# Patient Record
Sex: FEMALE | Race: WHITE | NOT HISPANIC OR LATINO | Employment: UNEMPLOYED | URBAN - METROPOLITAN AREA
[De-identification: names, ages, dates, MRNs, and addresses within clinical notes are randomized per-mention and may not be internally consistent; named-entity substitution may affect disease eponyms.]

---
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
## 2023-09-13 ENCOUNTER — HOSPITAL ENCOUNTER (EMERGENCY)
Facility: HOSPITAL | Age: 18
Discharge: HOME | End: 2023-09-13
Attending: STUDENT IN AN ORGANIZED HEALTH CARE EDUCATION/TRAINING PROGRAM
Payer: MEDICAID

## 2023-09-13 VITALS
BODY MASS INDEX: 30.79 KG/M2 | HEART RATE: 67 BPM | HEIGHT: 62 IN | RESPIRATION RATE: 13 BRPM | WEIGHT: 167.31 LBS | SYSTOLIC BLOOD PRESSURE: 106 MMHG | OXYGEN SATURATION: 98 % | TEMPERATURE: 98 F | DIASTOLIC BLOOD PRESSURE: 67 MMHG

## 2023-09-13 DIAGNOSIS — R10.9 ABDOMINAL PAIN, UNSPECIFIED ABDOMINAL LOCATION: Primary | ICD-10-CM

## 2023-09-13 LAB
ALBUMIN SERPL-MCNC: 4.9 G/DL (ref 3.5–5)
ALBUMIN/GLOB SERPL: 1.3 RATIO (ref 1.1–2)
ALP SERPL-CCNC: 87 UNIT/L (ref 40–150)
ALT SERPL-CCNC: 15 UNIT/L (ref 0–55)
APPEARANCE UR: CLEAR
AST SERPL-CCNC: 17 UNIT/L (ref 5–34)
B-HCG UR QL: NEGATIVE
BACTERIA #/AREA URNS AUTO: ABNORMAL /HPF
BASOPHILS # BLD AUTO: 0.07 X10(3)/MCL
BASOPHILS NFR BLD AUTO: 0.7 %
BILIRUB SERPL-MCNC: 0.7 MG/DL
BILIRUB UR QL STRIP.AUTO: NEGATIVE
BUN SERPL-MCNC: 10.2 MG/DL (ref 7–18.7)
CALCIUM SERPL-MCNC: 10.8 MG/DL (ref 8.4–10.2)
CHLORIDE SERPL-SCNC: 104 MMOL/L (ref 98–107)
CO2 SERPL-SCNC: 27 MMOL/L (ref 22–29)
COLOR UR: YELLOW
CREAT SERPL-MCNC: 0.93 MG/DL (ref 0.55–1.02)
CTP QC/QA: YES
EOSINOPHIL # BLD AUTO: 0.08 X10(3)/MCL (ref 0–0.9)
EOSINOPHIL NFR BLD AUTO: 0.9 %
ERYTHROCYTE [DISTWIDTH] IN BLOOD BY AUTOMATED COUNT: 11.1 % (ref 11.5–17)
GFR SERPLBLD CREATININE-BSD FMLA CKD-EPI: >60 MLS/MIN/1.73/M2
GLOBULIN SER-MCNC: 3.7 GM/DL (ref 2.4–3.5)
GLUCOSE SERPL-MCNC: 87 MG/DL (ref 74–100)
GLUCOSE UR QL STRIP.AUTO: NORMAL
HCT VFR BLD AUTO: 43.8 % (ref 37–47)
HGB BLD-MCNC: 14.8 G/DL (ref 12–16)
HYALINE CASTS #/AREA URNS LPF: ABNORMAL /LPF
IMM GRANULOCYTES # BLD AUTO: 0.02 X10(3)/MCL (ref 0–0.04)
IMM GRANULOCYTES NFR BLD AUTO: 0.2 %
KETONES UR QL STRIP.AUTO: NEGATIVE
LEUKOCYTE ESTERASE UR QL STRIP.AUTO: NEGATIVE
LIPASE SERPL-CCNC: 17 U/L
LYMPHOCYTES # BLD AUTO: 3.04 X10(3)/MCL (ref 0.6–4.6)
LYMPHOCYTES NFR BLD AUTO: 32.5 %
MAGNESIUM SERPL-MCNC: 2.1 MG/DL (ref 1.6–2.6)
MCH RBC QN AUTO: 30 PG (ref 27–31)
MCHC RBC AUTO-ENTMCNC: 33.8 G/DL (ref 33–36)
MCV RBC AUTO: 88.8 FL (ref 80–94)
MONOCYTES # BLD AUTO: 0.66 X10(3)/MCL (ref 0.1–1.3)
MONOCYTES NFR BLD AUTO: 7.1 %
MUCOUS THREADS URNS QL MICRO: ABNORMAL /LPF
NEUTROPHILS # BLD AUTO: 5.47 X10(3)/MCL (ref 2.1–9.2)
NEUTROPHILS NFR BLD AUTO: 58.6 %
NITRITE UR QL STRIP.AUTO: NEGATIVE
NRBC BLD AUTO-RTO: 0 %
PH UR STRIP.AUTO: 7 [PH]
PLATELET # BLD AUTO: 326 X10(3)/MCL (ref 130–400)
PMV BLD AUTO: 10.9 FL (ref 7.4–10.4)
POTASSIUM SERPL-SCNC: 3.4 MMOL/L (ref 3.5–5.1)
PROT SERPL-MCNC: 8.6 GM/DL (ref 6.4–8.3)
PROT UR QL STRIP.AUTO: ABNORMAL
RBC # BLD AUTO: 4.93 X10(6)/MCL (ref 4.2–5.4)
RBC #/AREA URNS AUTO: ABNORMAL /HPF
RBC UR QL AUTO: NEGATIVE
SODIUM SERPL-SCNC: 142 MMOL/L (ref 136–145)
SP GR UR STRIP.AUTO: 1.02 (ref 1–1.03)
SQUAMOUS #/AREA URNS LPF: ABNORMAL /HPF
UROBILINOGEN UR STRIP-ACNC: NORMAL
WBC # SPEC AUTO: 9.34 X10(3)/MCL (ref 4.5–11.5)
WBC #/AREA URNS AUTO: ABNORMAL /HPF

## 2023-09-13 PROCEDURE — 96375 TX/PRO/DX INJ NEW DRUG ADDON: CPT

## 2023-09-13 PROCEDURE — 25000003 PHARM REV CODE 250: Performed by: STUDENT IN AN ORGANIZED HEALTH CARE EDUCATION/TRAINING PROGRAM

## 2023-09-13 PROCEDURE — 83735 ASSAY OF MAGNESIUM: CPT | Performed by: STUDENT IN AN ORGANIZED HEALTH CARE EDUCATION/TRAINING PROGRAM

## 2023-09-13 PROCEDURE — 99285 EMERGENCY DEPT VISIT HI MDM: CPT | Mod: 25

## 2023-09-13 PROCEDURE — 25500020 PHARM REV CODE 255

## 2023-09-13 PROCEDURE — 96361 HYDRATE IV INFUSION ADD-ON: CPT

## 2023-09-13 PROCEDURE — 81025 URINE PREGNANCY TEST: CPT | Performed by: STUDENT IN AN ORGANIZED HEALTH CARE EDUCATION/TRAINING PROGRAM

## 2023-09-13 PROCEDURE — 63600175 PHARM REV CODE 636 W HCPCS: Performed by: STUDENT IN AN ORGANIZED HEALTH CARE EDUCATION/TRAINING PROGRAM

## 2023-09-13 PROCEDURE — 81001 URINALYSIS AUTO W/SCOPE: CPT | Performed by: STUDENT IN AN ORGANIZED HEALTH CARE EDUCATION/TRAINING PROGRAM

## 2023-09-13 PROCEDURE — 83690 ASSAY OF LIPASE: CPT | Performed by: STUDENT IN AN ORGANIZED HEALTH CARE EDUCATION/TRAINING PROGRAM

## 2023-09-13 PROCEDURE — 80053 COMPREHEN METABOLIC PANEL: CPT | Performed by: STUDENT IN AN ORGANIZED HEALTH CARE EDUCATION/TRAINING PROGRAM

## 2023-09-13 PROCEDURE — 96374 THER/PROPH/DIAG INJ IV PUSH: CPT | Mod: 59

## 2023-09-13 PROCEDURE — 85025 COMPLETE CBC W/AUTO DIFF WBC: CPT | Performed by: STUDENT IN AN ORGANIZED HEALTH CARE EDUCATION/TRAINING PROGRAM

## 2023-09-13 RX ORDER — ONDANSETRON 4 MG/1
4 TABLET, ORALLY DISINTEGRATING ORAL EVERY 12 HOURS PRN
Qty: 20 TABLET | Refills: 0 | Status: SHIPPED | OUTPATIENT
Start: 2023-09-13

## 2023-09-13 RX ORDER — ONDANSETRON 2 MG/ML
4 INJECTION INTRAMUSCULAR; INTRAVENOUS
Status: COMPLETED | OUTPATIENT
Start: 2023-09-13 | End: 2023-09-13

## 2023-09-13 RX ORDER — KETOROLAC TROMETHAMINE 30 MG/ML
15 INJECTION, SOLUTION INTRAMUSCULAR; INTRAVENOUS
Status: COMPLETED | OUTPATIENT
Start: 2023-09-13 | End: 2023-09-13

## 2023-09-13 RX ORDER — DICYCLOMINE HYDROCHLORIDE 20 MG/1
20 TABLET ORAL 3 TIMES DAILY PRN
Qty: 20 TABLET | Refills: 0 | Status: SHIPPED | OUTPATIENT
Start: 2023-09-13

## 2023-09-13 RX ORDER — MORPHINE SULFATE 2 MG/ML
4 INJECTION, SOLUTION INTRAMUSCULAR; INTRAVENOUS
Status: DISCONTINUED | OUTPATIENT
Start: 2023-09-13 | End: 2023-09-14 | Stop reason: HOSPADM

## 2023-09-13 RX ADMIN — SODIUM CHLORIDE 1000 ML: 9 INJECTION, SOLUTION INTRAVENOUS at 09:09

## 2023-09-13 RX ADMIN — IOHEXOL 100 ML: 350 INJECTION, SOLUTION INTRAVENOUS at 09:09

## 2023-09-13 RX ADMIN — ONDANSETRON 4 MG: 2 INJECTION INTRAMUSCULAR; INTRAVENOUS at 09:09

## 2023-09-13 RX ADMIN — KETOROLAC TROMETHAMINE 15 MG: 30 INJECTION, SOLUTION INTRAMUSCULAR; INTRAVENOUS at 09:09

## 2023-09-13 NOTE — Clinical Note
"Rosalba Salmon (Kaitlyn)evelio was seen and treated in our emergency department on 9/13/2023.  She may return to work on 09/15/2023.       If you have any questions or concerns, please don't hesitate to call.      Rajesh GARCIA    "

## 2023-09-14 NOTE — DISCHARGE INSTRUCTIONS
A referral has been placed, they should be calling you soon to set up a GI appointment.  Take your prescriptions as prescribed, return to the ER with any new or worsening symptoms.

## 2023-09-14 NOTE — ED PROVIDER NOTES
Encounter Date: 9/13/2023       History     Chief Complaint   Patient presents with    Abdominal Pain     RLQ pain x3 days     Patient presents to the emergency department complaining of right lower quadrant abdominal pain.  She states the pain started 3 days ago, initially it was a vague pain right upper quadrant but as the days have gone on it was now more localized in the right lower quadrant.  She also had no appetite and when she tries to eat she would vomit since she is been having diarrhea as well.  She states the pain hurts worse when she moves and the bumps in the car on the way here made the pain worse.  No fevers.  Has a previous history of a cholecystectomy.    The history is provided by the patient.     Review of patient's allergies indicates:   Allergen Reactions    Amoxicillin-pot clavulanate     Cinnamon analogues      Past Medical History:   Diagnosis Date    ADHD     Anxiety     Arrhythmia     Asthma     GERD (gastroesophageal reflux disease)     Mycoplasma pneumonia     Vesicoureteral reflux      Past Surgical History:   Procedure Laterality Date    CHOLECYSTECTOMY       Family History   Problem Relation Age of Onset    Gallbladder disease Mother         removed    Hypertension Maternal Grandmother     Atrial fibrillation Maternal Grandfather     Arrhythmia Maternal Grandfather     Irregular heart beat Maternal Grandfather     Hypertension Maternal Grandfather     Anemia Neg Hx     Cardiomyopathy Neg Hx     Childhood respiratory disease Neg Hx     Clotting disorder Neg Hx     Congenital heart disease Neg Hx     Early death Neg Hx     Heart attacks under age 50 Neg Hx      Social History     Tobacco Use    Smoking status: Passive Smoke Exposure - Never Smoker     Review of Systems   Constitutional:  Negative for chills and fever.   HENT:  Negative for congestion and sore throat.    Respiratory:  Negative for cough and shortness of breath.    Cardiovascular:  Negative for chest pain and  palpitations.   Gastrointestinal:  Positive for abdominal pain, diarrhea, nausea and vomiting.   Genitourinary:  Negative for dysuria and hematuria.   Musculoskeletal:  Negative for arthralgias and myalgias.   Neurological:  Negative for dizziness and weakness.       Physical Exam     Initial Vitals [09/13/23 2040]   BP Pulse Resp Temp SpO2   (!) 147/105 99 20 98.3 °F (36.8 °C) 99 %      MAP       --         Physical Exam    Nursing note and vitals reviewed.  Constitutional: She appears well-developed and well-nourished.   HENT:   Head: Normocephalic and atraumatic.   Eyes: EOM are normal. Pupils are equal, round, and reactive to light.   Neck: Neck supple.   Normal range of motion.  Cardiovascular:  Normal rate and regular rhythm.           Pulmonary/Chest: Breath sounds normal. No respiratory distress.   Abdominal: Abdomen is soft. There is abdominal tenderness (right lower quadrant). There is guarding.   Musculoskeletal:         General: No edema. Normal range of motion.      Cervical back: Normal range of motion and neck supple.     Neurological: She is alert and oriented to person, place, and time.   Skin: Skin is warm and dry.         ED Course   Procedures  Labs Reviewed   COMPREHENSIVE METABOLIC PANEL - Abnormal; Notable for the following components:       Result Value    Potassium Level 3.4 (*)     Calcium Level Total 10.8 (*)     Protein Total 8.6 (*)     Globulin 3.7 (*)     All other components within normal limits   URINALYSIS, REFLEX TO URINE CULTURE - Abnormal; Notable for the following components:    Protein, UA Trace (*)     Bacteria, UA Trace (*)     Squamous Epithelial Cells, UA Few (*)     Mucous, UA Moderate (*)     All other components within normal limits   CBC WITH DIFFERENTIAL - Abnormal; Notable for the following components:    RDW 11.1 (*)     MPV 10.9 (*)     All other components within normal limits   LIPASE - Normal   MAGNESIUM - Normal   CBC W/ AUTO DIFFERENTIAL    Narrative:     The  following orders were created for panel order CBC auto differential.  Procedure                               Abnormality         Status                     ---------                               -----------         ------                     CBC with Differential[[id number]]       Abnormal            Final result                 Please view results for these tests on the individual orders.   EXTRA TUBES    Narrative:     The following orders were created for panel order EXTRA TUBES.  Procedure                               Abnormality         Status                     ---------                               -----------         ------                     Light Blue Top Hold[[id number]]                             In process                 Red Top Hold[[id number]]                                    In process                 Gold Top Hold[[id number]]                                   In process                 Pink Top Hold[[id number]]                                   In process                   Please view results for these tests on the individual orders.   LIGHT BLUE TOP HOLD   RED TOP HOLD   GOLD TOP HOLD   PINK TOP HOLD   POCT URINE PREGNANCY          Imaging Results              CT Abdomen Pelvis With Contrast (Final result)  Result time 09/14/23 09:18:10      Final result by Reid Sharp MD (09/14/23 09:18:10)                   Impression:      No acute abnormalities are seen      Electronically signed by: Reid Sharp MD  Date:    09/14/2023  Time:    09:18               Narrative:    EXAMINATION:  CT ABDOMEN PELVIS WITH CONTRAST    CLINICAL HISTORY:  RLQ abdominal pain (Age >= 14y);    TECHNIQUE:  Low dose axial images, sagittal and coronal reformations were obtained from the lung bases to the pubic symphysis following the IV administration of 100 mL of Omnipaque 350 no oral contrast was given.    Automatic exposure control (AEC) was utilized for dose reduction.    Dose: 618  mGycm    COMPARISON:  None.    FINDINGS:  Lung bases are clear.  Liver appears normal.  Spleen appears normal.  Pancreas appears normal.  Patient has had a previous cholecystectomy.  The adrenals are not enlarged.  Kidneys appear normal.  Aorta shows no evidence of an aneurysm.  The appendix appears normal.  Uterus appears normal.                        Preliminary result by Reid Sharp MD (09/13/23 22:09:01)                   Narrative:    START OF REPORT:  Technique: CT of the abdomen and pelvis was performed with axial images as well as sagittal and coronal reconstruction images with intravenous contrast.    Comparison: None available.    Clinical History: RLQ abdominal pain.    Dosage Information: Automated Exposure Control was utilized.    Findings:  Lines and Tubes: None.  Thorax:  Lungs: The visualized lung bases appear unremarkable.  Pleura: No pleural effusion is seen.  Heart: The heart size is within normal limits.  Abdomen:  Abdominal Wall: No abdominal wall pathology is seen.  Liver: The liver appears unremarkable.  Biliary System: No intrahepatic or extrahepatic biliary duct dilatation is seen.  Gallbladder: Surgical clips are seen in the gallbladder fossa consistent with prior cholecystectomy correlate with surgical history and visual inspection.  Pancreas: The pancreas appears unremarkable.  Spleen: The spleen appears unremarkable.  Adrenals: The adrenal glands appear unremarkable.  Kidneys: The kidneys appear unremarkable with no stones cysts masses or hydronephrosis.  Aorta: The abdominal aorta appears unremarkable.  IVC: Unremarkable.  Bowel:  Esophagus: The visualized esophagus appears unremarkable.  Stomach: The stomach appears unremarkable.  Duodenum: Unremarkable appearing duodenum.  Small Bowel: The small bowel appears unremarkable.  Colon: Nondistended.  Appendix: The appendix appears unremarkable and is seen on series 2, images 91-96.  Peritoneum: No intraperitoneal free air or ascites  is seen.    Pelvis:  Bladder: The bladder appears unremarkable.  Female:  Uterus: The uterus appears unremarkable.  Ovaries: The ovaries appear unremarkable.    Bony structures:  Dorsal Spine: The visualized dorsal spine appears unremarkable.  Bony Pelvis: The visualized bony structures of the pelvis appear unremarkable.      Impression:  1. No acute intraabdominal or pelvic pathology is identified. Details and other findings as discussed above.                                         Medications   sodium chloride 0.9% bolus 1,000 mL 1,000 mL (0 mLs Intravenous Stopped 9/13/23 2206)   ondansetron injection 4 mg (4 mg Intravenous Given 9/13/23 2100)   ketorolac injection 15 mg (15 mg Intravenous Given 9/13/23 2108)   iohexoL (OMNIPAQUE 350) 350 mg iodine/mL injection (100 mLs Intravenous Given 9/13/23 2115)     Medical Decision Making  Vital signs are stable.  She was given IV fluids IV pain meds and IV antiemetics.  CBC, CMP, urine were unremarkable, lipase is also within normal limits.  CT of the abdomen and pelvis shows no acute process.  On re-evaluation, patient was now stating that she is had pain like this since she was 12 years old, seen multiple physicians and besides having her gallbladder out nothing has ever been found wrong.  Given her reassuring workup today, we will discharge with a prescription for Bentyl and referred to GI, returnprecautions were given.    Amount and/or Complexity of Data Reviewed  Labs: ordered. Decision-making details documented in ED Course.  Radiology: ordered. Decision-making details documented in ED Course.    Risk  Prescription drug management.                               Clinical Impression:   Final diagnoses:  [R10.9] Abdominal pain, unspecified abdominal location (Primary)        ED Disposition Condition    Discharge Stable          ED Prescriptions       Medication Sig Dispense Start Date End Date Auth. Provider    dicyclomine (BENTYL) 20 mg tablet Take 1 tablet (20 mg  total) by mouth 3 (three) times daily as needed (pain). 20 tablet 9/13/2023 -- Fly Butler MD    ondansetron (ZOFRAN-ODT) 4 MG TbDL Take 1 tablet (4 mg total) by mouth every 12 (twelve) hours as needed (Nausea). 20 tablet 9/13/2023 -- Fly Butler MD          Follow-up Information       Follow up With Specialties Details Why Contact Info    [facility] Emergency Dept Emergency Medicine Go to  As needed [address] [facility] [address] [phone]             Fly Butler MD  09/16/23 1955

## 2023-10-12 ENCOUNTER — HOSPITAL ENCOUNTER (EMERGENCY)
Facility: HOSPITAL | Age: 18
Discharge: HOME | End: 2023-10-12
Attending: EMERGENCY MEDICINE
Payer: MEDICAID

## 2023-10-12 VITALS
TEMPERATURE: 98 F | OXYGEN SATURATION: 100 % | BODY MASS INDEX: 30.83 KG/M2 | SYSTOLIC BLOOD PRESSURE: 132 MMHG | HEART RATE: 77 BPM | RESPIRATION RATE: 18 BRPM | DIASTOLIC BLOOD PRESSURE: 81 MMHG | WEIGHT: 167.56 LBS | HEIGHT: 62 IN

## 2023-10-12 DIAGNOSIS — L70.9 ACNE, UNSPECIFIED ACNE TYPE: Primary | ICD-10-CM

## 2023-10-12 LAB
B-HCG UR QL: NEGATIVE
CTP QC/QA: YES

## 2023-10-12 PROCEDURE — 99283 EMERGENCY DEPT VISIT LOW MDM: CPT

## 2023-10-12 PROCEDURE — 81025 URINE PREGNANCY TEST: CPT | Performed by: PHYSICIAN ASSISTANT

## 2023-10-12 RX ORDER — DOXYCYCLINE 100 MG/1
100 CAPSULE ORAL 2 TIMES DAILY
Qty: 28 CAPSULE | Refills: 0 | Status: SHIPPED | OUTPATIENT
Start: 2023-10-12 | End: 2023-10-26

## 2023-10-12 NOTE — DISCHARGE INSTRUCTIONS
Report to local health unit for full STD testing.  Take medication as prescribed.  Follow up with the primary care provider within 2-3 days.

## 2023-10-12 NOTE — ED PROVIDER NOTES
Encounter Date: 10/12/2023       History     Chief Complaint   Patient presents with    Wound Check     C/o acne to face, states has purulent drainage . Also has come in contact with herpes breakout. Would like to be tested. C/o sore throat also    Exposure to STD    Sore Throat     18-year-old female presents to the emergency department with complaints of pustular acne to face, dry/ scratchy throat and STD exposure.  Patient states that her boyfriend revealed that he had HSV 2 and she states that they had intercourse with an active outbreak without her being aware of the time.  Patient denies symptoms, lesions or pain however is requesting or herpes test.  Patient denies dysuria, hematuria, abdominal pain, nausea, vomiting, vaginal lesions.  She states she is being currently treated for bacterial vaginosis after being tested at an urgent care.    The history is provided by the patient. No  was used.     Review of patient's allergies indicates:   Allergen Reactions    Amoxicillin-pot clavulanate     Cinnamon analogues      Past Medical History:   Diagnosis Date    ADHD     Anxiety     Arrhythmia     Asthma     GERD (gastroesophageal reflux disease)     Mycoplasma pneumonia     Vesicoureteral reflux      Past Surgical History:   Procedure Laterality Date    CHOLECYSTECTOMY       Family History   Problem Relation Age of Onset    Gallbladder disease Mother         removed    Hypertension Maternal Grandmother     Atrial fibrillation Maternal Grandfather     Arrhythmia Maternal Grandfather     Irregular heart beat Maternal Grandfather     Hypertension Maternal Grandfather     Anemia Neg Hx     Cardiomyopathy Neg Hx     Childhood respiratory disease Neg Hx     Clotting disorder Neg Hx     Congenital heart disease Neg Hx     Early death Neg Hx     Heart attacks under age 50 Neg Hx      Social History     Tobacco Use    Smoking status: Passive Smoke Exposure - Never Smoker   Substance Use Topics     Alcohol use: Never    Drug use: Never     Review of Systems   Constitutional:  Negative for chills and fever.   Eyes: Negative.    Respiratory:  Negative for cough, chest tightness and shortness of breath.    Cardiovascular:  Negative for chest pain and palpitations.   Gastrointestinal:  Negative for abdominal pain, diarrhea, nausea and vomiting.   Genitourinary:  Negative for decreased urine volume, dysuria, flank pain, genital sores, hematuria, pelvic pain, vaginal bleeding, vaginal discharge and vaginal pain.   Musculoskeletal:  Negative for back pain and myalgias.   Skin:  Negative for rash and wound.        Painful acne on face   Neurological:  Negative for dizziness, weakness and numbness.       Physical Exam     Initial Vitals [10/12/23 0936]   BP Pulse Resp Temp SpO2   134/89 79 20 97.5 °F (36.4 °C) 98 %      MAP       --         Physical Exam    Nursing note and vitals reviewed.  Constitutional: She appears well-developed and well-nourished.   HENT:   Nose: Nose normal.   Eyes: Conjunctivae are normal.   Neck: Neck supple.   Normal range of motion.  Cardiovascular:  Normal rate, regular rhythm, normal heart sounds and intact distal pulses.           Pulmonary/Chest: Breath sounds normal.   Abdominal: Abdomen is soft. Bowel sounds are normal. There is no abdominal tenderness. There is no rebound and no guarding.   Genitourinary:    Genitourinary Comments: Patient declined pelvic exam     Musculoskeletal:         General: Normal range of motion.      Cervical back: Normal range of motion and neck supple.     Neurological: She is alert.   Skin: Skin is warm. Capillary refill takes less than 2 seconds.   Pustular acne to face         ED Course   Procedures  Labs Reviewed   POCT URINE PREGNANCY          Imaging Results    None          Medications - No data to display  Medical Decision Making  18-year-old female presents to the emergency department with complaints of pustular acne to face, dry/ scratchy throat  and STD exposure.  Patient states that her boyfriend revealed that he had HSV 2 and she states that they had intercourse with an active outbreak without her being aware of the time.  Patient denies symptoms, lesions or pain however is requesting or herpes test.  Patient denies dysuria, hematuria, abdominal pain, nausea, vomiting, vaginal lesions.  She states she is being currently treated for bacterial vaginosis after being tested at an urgent care.    DDx:  STD exposure, pustular acne, allergic reaction    I prescribed doxycycline for facial acne and advised patient to follow up with a dermatologist for further evaluation and treatment.  I also advised the patient to report to [facility] for full STD testing and discussed with her that the emergency department does not do screening STD testing.  Patient states she will report to local [facility] unit at the time of discharge.    The patient is resting comfortably and in no acute distress.  Gave strict ED precautions, discussed specific conditions for return to the emergency department and importance of follow up with her primary care provider and dermatologist.  Patient voices understanding and agrees to the plan discussed. All patients' questions have been answered at this time.   She has remained hemodynamically stable throughout entire stay in ED and is stable for discharge home.      Amount and/or Complexity of Data Reviewed  Labs: ordered.    Risk  Prescription drug management.                               Clinical Impression:   Final diagnoses:  [L70.9] Acne, unspecified acne type (Primary)        ED Disposition Condition    Discharge Stable          ED Prescriptions       Medication Sig Dispense Start Date End Date Auth. Provider    doxycycline (VIBRAMYCIN) 100 MG Cap Take 1 capsule (100 mg total) by mouth 2 (two) times daily. for 14 days 28 capsule 10/12/2023 10/26/2023 Aretha Dueñas PA          Follow-up Information       Follow up With Specialties  Details Why Contact Info    [facility] - Emergency Dept Emergency Medicine  As needed, If symptoms worsen [address] [facility] [address] [phone]             Aretha Dueñas PA  10/12/23 4472

## 2024-05-13 ENCOUNTER — HOSPITAL ENCOUNTER (EMERGENCY)
Facility: HOSPITAL | Age: 19
Discharge: HOME | End: 2024-05-13
Attending: FAMILY MEDICINE
Payer: MEDICAID

## 2024-05-13 VITALS
DIASTOLIC BLOOD PRESSURE: 49 MMHG | TEMPERATURE: 98 F | HEART RATE: 69 BPM | BODY MASS INDEX: 34.08 KG/M2 | SYSTOLIC BLOOD PRESSURE: 94 MMHG | RESPIRATION RATE: 18 BRPM | HEIGHT: 62 IN | WEIGHT: 185.19 LBS | OXYGEN SATURATION: 97 %

## 2024-05-13 DIAGNOSIS — G43.909 MIGRAINE WITHOUT STATUS MIGRAINOSUS, NOT INTRACTABLE, UNSPECIFIED MIGRAINE TYPE: Primary | ICD-10-CM

## 2024-05-13 DIAGNOSIS — N92.6 ABNORMAL MENSTRUAL CYCLE: ICD-10-CM

## 2024-05-13 LAB
ALBUMIN SERPL-MCNC: 4.5 G/DL (ref 3.5–5)
ALBUMIN/GLOB SERPL: 1.2 RATIO (ref 1.1–2)
ALP SERPL-CCNC: 92 UNIT/L (ref 40–150)
ALT SERPL-CCNC: 19 UNIT/L (ref 0–55)
AST SERPL-CCNC: 16 UNIT/L (ref 5–34)
B-HCG UR QL: NEGATIVE
BASOPHILS # BLD AUTO: 0.06 X10(3)/MCL
BASOPHILS NFR BLD AUTO: 0.4 %
BILIRUB SERPL-MCNC: 0.7 MG/DL
BUN SERPL-MCNC: 8.6 MG/DL (ref 7–18.7)
CALCIUM SERPL-MCNC: 10.3 MG/DL (ref 8.4–10.2)
CHLORIDE SERPL-SCNC: 105 MMOL/L (ref 98–107)
CO2 SERPL-SCNC: 21 MMOL/L (ref 22–29)
CREAT SERPL-MCNC: 0.8 MG/DL (ref 0.55–1.02)
CTP QC/QA: YES
EOSINOPHIL # BLD AUTO: 0.02 X10(3)/MCL (ref 0–0.9)
EOSINOPHIL NFR BLD AUTO: 0.1 %
ERYTHROCYTE [DISTWIDTH] IN BLOOD BY AUTOMATED COUNT: 11.1 % (ref 11.5–17)
FLUAV AG UPPER RESP QL IA.RAPID: NOT DETECTED
FLUBV AG UPPER RESP QL IA.RAPID: NOT DETECTED
GFR SERPLBLD CREATININE-BSD FMLA CKD-EPI: >60 ML/MIN/1.73/M2
GLOBULIN SER-MCNC: 3.8 GM/DL (ref 2.4–3.5)
GLUCOSE SERPL-MCNC: 90 MG/DL (ref 74–100)
HCT VFR BLD AUTO: 43.9 % (ref 37–47)
HGB BLD-MCNC: 15.4 G/DL (ref 12–16)
HOLD SPECIMEN: NORMAL
IMM GRANULOCYTES # BLD AUTO: 0.05 X10(3)/MCL (ref 0–0.04)
IMM GRANULOCYTES NFR BLD AUTO: 0.4 %
LIPASE SERPL-CCNC: 18 U/L
LYMPHOCYTES # BLD AUTO: 1.93 X10(3)/MCL (ref 0.6–4.6)
LYMPHOCYTES NFR BLD AUTO: 14.1 %
MCH RBC QN AUTO: 30.1 PG (ref 27–31)
MCHC RBC AUTO-ENTMCNC: 35.1 G/DL (ref 33–36)
MCV RBC AUTO: 85.7 FL (ref 80–94)
MONOCYTES # BLD AUTO: 0.51 X10(3)/MCL (ref 0.1–1.3)
MONOCYTES NFR BLD AUTO: 3.7 %
NEUTROPHILS # BLD AUTO: 11.1 X10(3)/MCL (ref 2.1–9.2)
NEUTROPHILS NFR BLD AUTO: 81.3 %
NRBC BLD AUTO-RTO: 0 %
PLATELET # BLD AUTO: 335 X10(3)/MCL (ref 130–400)
PMV BLD AUTO: 10.8 FL (ref 7.4–10.4)
POTASSIUM SERPL-SCNC: 3.7 MMOL/L (ref 3.5–5.1)
PROT SERPL-MCNC: 8.3 GM/DL (ref 6.4–8.3)
RBC # BLD AUTO: 5.12 X10(6)/MCL (ref 4.2–5.4)
SARS-COV-2 RNA RESP QL NAA+PROBE: NOT DETECTED
SODIUM SERPL-SCNC: 137 MMOL/L (ref 136–145)
WBC # SPEC AUTO: 13.67 X10(3)/MCL (ref 4.5–11.5)

## 2024-05-13 PROCEDURE — 83690 ASSAY OF LIPASE: CPT | Performed by: PHYSICIAN ASSISTANT

## 2024-05-13 PROCEDURE — 0240U COVID/FLU A&B PCR: CPT | Performed by: PHYSICIAN ASSISTANT

## 2024-05-13 PROCEDURE — 96361 HYDRATE IV INFUSION ADD-ON: CPT

## 2024-05-13 PROCEDURE — 99285 EMERGENCY DEPT VISIT HI MDM: CPT | Mod: 25

## 2024-05-13 PROCEDURE — 25500020 PHARM REV CODE 255

## 2024-05-13 PROCEDURE — 63600175 PHARM REV CODE 636 W HCPCS: Performed by: PHYSICIAN ASSISTANT

## 2024-05-13 PROCEDURE — 25000003 PHARM REV CODE 250: Performed by: PHYSICIAN ASSISTANT

## 2024-05-13 PROCEDURE — 80053 COMPREHEN METABOLIC PANEL: CPT | Performed by: PHYSICIAN ASSISTANT

## 2024-05-13 PROCEDURE — 96374 THER/PROPH/DIAG INJ IV PUSH: CPT | Mod: 59

## 2024-05-13 PROCEDURE — 85025 COMPLETE CBC W/AUTO DIFF WBC: CPT | Performed by: PHYSICIAN ASSISTANT

## 2024-05-13 RX ORDER — DIPHENHYDRAMINE HYDROCHLORIDE 50 MG/ML
12.5 INJECTION INTRAMUSCULAR; INTRAVENOUS
Status: DISCONTINUED | OUTPATIENT
Start: 2024-05-13 | End: 2024-05-14 | Stop reason: HOSPADM

## 2024-05-13 RX ORDER — KETOROLAC TROMETHAMINE 30 MG/ML
15 INJECTION, SOLUTION INTRAMUSCULAR; INTRAVENOUS
Status: COMPLETED | OUTPATIENT
Start: 2024-05-13 | End: 2024-05-13

## 2024-05-13 RX ORDER — KETOROLAC TROMETHAMINE 10 MG/1
10 TABLET, FILM COATED ORAL EVERY 6 HOURS
Qty: 20 TABLET | Refills: 0 | Status: SHIPPED | OUTPATIENT
Start: 2024-05-13 | End: 2024-05-18

## 2024-05-13 RX ORDER — DIPHENHYDRAMINE HCL 25 MG
25 CAPSULE ORAL EVERY 6 HOURS PRN
Qty: 20 CAPSULE | Refills: 0 | Status: SHIPPED | OUTPATIENT
Start: 2024-05-13

## 2024-05-13 RX ORDER — METOCLOPRAMIDE HYDROCHLORIDE 5 MG/ML
10 INJECTION INTRAMUSCULAR; INTRAVENOUS
Status: DISCONTINUED | OUTPATIENT
Start: 2024-05-13 | End: 2024-05-14 | Stop reason: HOSPADM

## 2024-05-13 RX ORDER — SODIUM CHLORIDE 9 MG/ML
1000 INJECTION, SOLUTION INTRAVENOUS
Status: COMPLETED | OUTPATIENT
Start: 2024-05-13 | End: 2024-05-13

## 2024-05-13 RX ORDER — METOCLOPRAMIDE 10 MG/1
10 TABLET ORAL EVERY 6 HOURS
Qty: 30 TABLET | Refills: 0 | Status: SHIPPED | OUTPATIENT
Start: 2024-05-13

## 2024-05-13 RX ADMIN — KETOROLAC TROMETHAMINE 15 MG: 30 INJECTION, SOLUTION INTRAMUSCULAR; INTRAVENOUS at 07:05

## 2024-05-13 RX ADMIN — SODIUM CHLORIDE 1000 ML: 9 INJECTION, SOLUTION INTRAVENOUS at 07:05

## 2024-05-13 RX ADMIN — IOHEXOL 100 ML: 350 INJECTION, SOLUTION INTRAVENOUS at 09:05

## 2024-05-13 NOTE — Clinical Note
"Rosalba Moise" Bret was seen and treated in our emergency department on 5/13/2024.  She may return to work on 05/15/2024.       If you have any questions or concerns, please don't hesitate to call.      Josie Edwards PA-C"

## 2024-05-13 NOTE — Clinical Note
"Rosalba Salmon (Kaitlyn)evelio was seen and treated in our emergency department on 5/13/2024.  She may return to work on 05/16/2024.       If you have any questions or concerns, please don't hesitate to call.      TOMASA Tolbert RN    "

## 2024-05-14 NOTE — ED PROVIDER NOTES
Encounter Date: 5/13/2024       History     Chief Complaint   Patient presents with    Headache     Pt. Endorses migraine headache all day. NV, bilat hand numbness and dizziness.     Rosalba Queen is a 18 y.o. female who presents to the ED with complaints of a migraine headache that started this morning. She reports nausea and vomiting as well. Reports intermittent dizziness along with bilateral hand numbness. States she took OTC medication without relief.       The history is provided by the patient. No  was used.     Review of patient's allergies indicates:   Allergen Reactions    Amoxicillin-pot clavulanate     Cinnamon analogues      Past Medical History:   Diagnosis Date    ADHD     Anxiety     Arrhythmia     Asthma     GERD (gastroesophageal reflux disease)     Mycoplasma pneumonia     Vesicoureteral reflux      Past Surgical History:   Procedure Laterality Date    CHOLECYSTECTOMY       Family History   Problem Relation Name Age of Onset    Gallbladder disease Mother          removed    Hypertension Maternal Grandmother      Atrial fibrillation Maternal Grandfather      Arrhythmia Maternal Grandfather      Irregular heart beat Maternal Grandfather      Hypertension Maternal Grandfather      Anemia Neg Hx      Cardiomyopathy Neg Hx      Childhood respiratory disease Neg Hx      Clotting disorder Neg Hx      Congenital heart disease Neg Hx      Early death Neg Hx      Heart attacks under age 50 Neg Hx       Social History     Tobacco Use    Smoking status: Passive Smoke Exposure - Never Smoker   Substance Use Topics    Alcohol use: Never    Drug use: Never     Review of Systems   Constitutional:  Negative for chills, fatigue and fever.   HENT:  Negative for congestion, ear pain, sinus pain and sore throat.    Eyes:  Negative for pain.   Respiratory:  Negative for cough, chest tightness and shortness of breath.    Cardiovascular:  Negative for chest pain.   Gastrointestinal:  Positive  for nausea and vomiting. Negative for abdominal pain, constipation and diarrhea.   Genitourinary:  Negative for dysuria.   Musculoskeletal:  Negative for back pain and joint swelling.   Skin:  Negative for color change and rash.   Neurological:  Positive for dizziness, numbness and headaches. Negative for weakness.   Psychiatric/Behavioral:  Negative for behavioral problems and confusion.        Physical Exam     Initial Vitals [05/13/24 1913]   BP Pulse Resp Temp SpO2   131/80 (!) 113 18 98.6 °F (37 °C) 98 %      MAP       --         Physical Exam    Constitutional: She appears well-developed and well-nourished.   HENT:   Head: Normocephalic and atraumatic.   Nose: Nose normal.   Eyes: EOM are normal. Pupils are equal, round, and reactive to light.   Neck: Neck supple. No thyromegaly present. No JVD present.   Normal range of motion.  Cardiovascular:  Normal rate, regular rhythm, normal heart sounds and intact distal pulses.           No murmur heard.  Pulmonary/Chest: Breath sounds normal. No respiratory distress. She has no wheezes. She has no rhonchi. She has no rales. She exhibits no tenderness.   Abdominal: Abdomen is soft. Bowel sounds are normal. She exhibits no distension. There is no abdominal tenderness. There is no rebound and no guarding.   Musculoskeletal:         General: No tenderness or edema. Normal range of motion.      Cervical back: Normal range of motion and neck supple.     Lymphadenopathy:     She has no cervical adenopathy.   Neurological: She is alert and oriented to person, place, and time.   Skin: Skin is warm and dry. Capillary refill takes less than 2 seconds.   Psychiatric: She has a normal mood and affect. Thought content normal.         ED Course   Procedures  Labs Reviewed   COMPREHENSIVE METABOLIC PANEL - Abnormal; Notable for the following components:       Result Value    CO2 21 (*)     Calcium 10.3 (*)     Globulin 3.8 (*)     All other components within normal limits   CBC WITH  DIFFERENTIAL - Abnormal; Notable for the following components:    WBC 13.67 (*)     RDW 11.1 (*)     MPV 10.8 (*)     Neut # 11.10 (*)     IG# 0.05 (*)     All other components within normal limits   LIPASE - Normal   COVID/FLU A&B PCR - Normal    Narrative:     The Xpert Xpress SARS-CoV-2/FLU/RSV plus is a rapid, multiplexed real-time PCR test intended for the simultaneous qualitative detection and differentiation of SARS-CoV-2, Influenza A, Influenza B, and respiratory syncytial virus (RSV) viral RNA in either nasopharyngeal swab or nasal swab specimens.         CBC W/ AUTO DIFFERENTIAL    Narrative:     The following orders were created for panel order CBC auto differential.  Procedure                               Abnormality         Status                     ---------                               -----------         ------                     CBC with Differential[[id number]]       Abnormal            Final result                 Please view results for these tests on the individual orders.   EXTRA TUBES    Narrative:     The following orders were created for panel order EXTRA TUBES.  Procedure                               Abnormality         Status                     ---------                               -----------         ------                     Light Blue Top Hold[[id number]]                             Final result               Red Top Hold[[id number]]                                    Final result               Gold Top Hold[[id number]]                                   Final result                 Please view results for these tests on the individual orders.   LIGHT BLUE TOP HOLD   RED TOP HOLD   GOLD TOP HOLD          Imaging Results              CT Abdomen Pelvis With IV Contrast NO Oral Contrast (Preliminary result)  Result time 05/13/24 21:36:02      Preliminary result by Leandro Ortiz Jr., MD (05/13/24 21:36:02)                   Narrative:    START OF REPORT:  Technique: CT of  the abdomen and pelvis was performed with axial images as well as sagittal and coronal reconstruction images with intravenous contrast.    Comparison: None available.    Clinical History: Headache (Pt. Endorses migraine headache all day. NV, bilat hand numbness and dizziness.    Dosage Information: Automated Exposure Control was utilized.    Findings:  Lines and Tubes: None.  Thorax:  Lungs: There is minimal nonspecific dependent change at the lung bases. No focal infiltrate or consolidation is seen.  Pleura: No effusions or thickening.  Heart: The heart size is within normal limits.  Abdomen:  Abdominal Wall: No abdominal wall pathology is seen.  Liver: The liver appears unremarkable.  Biliary System: No intrahepatic or extrahepatic biliary duct dilatation is seen.  Gallbladder: Surgical clips are seen in the gallbladder fossa which may reflect prior cholecystectomy correlate with surgical history and visual inspection.  Pancreas: The pancreas appears unremarkable.  Spleen: The spleen appears unremarkable.  Adrenals: The adrenal glands appear unremarkable.  Kidneys: The kidneys appear unremarkable with no stones cysts masses or hydronephrosis.  Aorta: The abdominal aorta appears unremarkable.  IVC: Unremarkable.  Bowel:  Esophagus: The visualized esophagus appears unremarkable.  Stomach: The stomach appears unremarkable.  Duodenum: Unremarkable appearing duodenum.  Small Bowel: The small bowel appears unremarkable.  Colon: Nondistended.  Appendix: The appendix appears unremarkable seen on Image 92, Series 2 through Image 97, Series 2.  Peritoneum: No intraperitoneal free air or ascites is seen.    Pelvis:  Bladder: The bladder is nondistended but appears otherwise unremarkable.  Female:  Uterus: The uterus appears unremarkable.  Ovaries: The ovaries appear unremarkable with probable dominant left sided physiologic cysts.    Bony structures:  Dorsal Spine: There is mild spondylosis of the visualized dorsal  "spine.  Bony Pelvis: The visualized bony structures of the pelvis appear unremarkable.      Impression:  1. No acute intraabdominal or pelvic solid organ or bowel pathology identified. Details as above.                                         Medications   metoclopramide injection 10 mg (has no administration in time range)   diphenhydrAMINE injection 12.5 mg (has no administration in time range)   0.9%  NaCl infusion ( Intravenous Stopped 5/13/24 2059)   ketorolac injection 15 mg (15 mg Intravenous Given 5/13/24 1944)   iohexoL (OMNIPAQUE 350) 350 mg iodine/mL injection (100 mLs Intravenous Given 5/13/24 2100)     Medical Decision Making  DDX: migraine, gastroenteritis, covid/flu, among others    Rosalba Queen is a 18 y.o. female who presents to the ED with complaints of a migraine headache that started this morning. She reports nausea and vomiting as well. Reports intermittent dizziness along with bilateral hand numbness. States she took OTC medication without relief.     Hospital Course: Labs and IV ordered. Toradol, Reglan, and Bendadryl ordered for patient. She refused the Benadryl and Reglan, she states one of those medications made her feel bad in the past. CBC with elevated WBC of 13.7. Will obtain CT for further evaluation. CT wnl. Patient reports improvement of symptoms from Toradol and IV fluids. It is likely she has an elevation of her WBC count from the number of times she vomited, and has a migraine. She reports a history of migraines in the past. She states she was treated with "some injection" but unsure of the name. I will DC home with Reglan, Benadryl, and Toradol. Strict return precautions given. She verbalized understanding. Stable for discharge.     She is also requesting a referral to GYN, as she has abnormal menstrual cycles.     Amount and/or Complexity of Data Reviewed  Labs: ordered.  Radiology: ordered.    Risk  Prescription drug management.                              "         Clinical Impression:  Final diagnoses:  [G43.909] Migraine without status migrainosus, not intractable, unspecified migraine type (Primary)  [N92.6] Abnormal menstrual cycle          ED Disposition Condition    Discharge Stable          ED Prescriptions       Medication Sig Dispense Start Date End Date Auth. Provider    ketorolac (TORADOL) 10 mg tablet Take 1 tablet (10 mg total) by mouth every 6 (six) hours. for 5 days 20 tablet 5/13/2024 5/18/2024 Josie Edwards PA-C    metoclopramide HCl (REGLAN) 10 MG tablet Take 1 tablet (10 mg total) by mouth every 6 (six) hours. 30 tablet 5/13/2024 -- Josie Edwards PA-C    diphenhydrAMINE (BENADRYL) 25 mg capsule Take 1 capsule (25 mg total) by mouth every 6 (six) hours as needed for Itching or Allergies. 20 capsule 5/13/2024 -- Josie Edwards PA-C          Follow-up Information       Follow up With Specialties Details Why Contact Info    Shailesh Leger III, APRN-CNP Family Medicine   [address] [facility] [address]  [phone]      [facility] Emergency Dept Emergency Medicine In 3 days As needed, If symptoms worsen [address] [facility] [address] [phone]             Josie Edwards PA-C  05/13/24 8128

## 2024-05-15 ENCOUNTER — HOSPITAL ENCOUNTER (EMERGENCY)
Facility: HOSPITAL | Age: 19
Discharge: HOME | End: 2024-05-15
Attending: EMERGENCY MEDICINE
Payer: MEDICAID

## 2024-05-15 VITALS
OXYGEN SATURATION: 99 % | TEMPERATURE: 98 F | BODY MASS INDEX: 35.25 KG/M2 | SYSTOLIC BLOOD PRESSURE: 114 MMHG | WEIGHT: 191.56 LBS | HEART RATE: 76 BPM | DIASTOLIC BLOOD PRESSURE: 66 MMHG | HEIGHT: 62 IN | RESPIRATION RATE: 18 BRPM

## 2024-05-15 DIAGNOSIS — R07.89 ATYPICAL CHEST PAIN: ICD-10-CM

## 2024-05-15 DIAGNOSIS — J06.9 VIRAL URI WITH COUGH: Primary | ICD-10-CM

## 2024-05-15 LAB
B-HCG UR QL: NEGATIVE
CTP QC/QA: YES
FLUAV AG UPPER RESP QL IA.RAPID: NOT DETECTED
FLUBV AG UPPER RESP QL IA.RAPID: NOT DETECTED
SARS-COV-2 RNA RESP QL NAA+PROBE: NOT DETECTED

## 2024-05-15 PROCEDURE — 99284 EMERGENCY DEPT VISIT MOD MDM: CPT | Mod: 25

## 2024-05-15 PROCEDURE — 0240U COVID/FLU A&B PCR: CPT | Performed by: PHYSICIAN ASSISTANT

## 2024-05-15 PROCEDURE — 96372 THER/PROPH/DIAG INJ SC/IM: CPT | Performed by: PHYSICIAN ASSISTANT

## 2024-05-15 PROCEDURE — 81025 URINE PREGNANCY TEST: CPT | Performed by: PHYSICIAN ASSISTANT

## 2024-05-15 PROCEDURE — 63600175 PHARM REV CODE 636 W HCPCS: Performed by: PHYSICIAN ASSISTANT

## 2024-05-15 RX ORDER — KETOROLAC TROMETHAMINE 10 MG/1
10 TABLET, FILM COATED ORAL EVERY 6 HOURS PRN
Qty: 20 TABLET | Refills: 0 | Status: SHIPPED | OUTPATIENT
Start: 2024-05-15 | End: 2024-05-20

## 2024-05-15 RX ORDER — KETOROLAC TROMETHAMINE 30 MG/ML
30 INJECTION, SOLUTION INTRAMUSCULAR; INTRAVENOUS
Status: COMPLETED | OUTPATIENT
Start: 2024-05-15 | End: 2024-05-15

## 2024-05-15 RX ORDER — LORATADINE 10 MG/1
10 TABLET ORAL DAILY
Qty: 7 TABLET | Refills: 0 | Status: SHIPPED | OUTPATIENT
Start: 2024-05-15 | End: 2024-05-22

## 2024-05-15 RX ORDER — BENZONATATE 100 MG/1
100 CAPSULE ORAL 3 TIMES DAILY PRN
Qty: 20 CAPSULE | Refills: 0 | Status: SHIPPED | OUTPATIENT
Start: 2024-05-15 | End: 2024-05-25

## 2024-05-15 RX ADMIN — KETOROLAC TROMETHAMINE 30 MG: 30 INJECTION, SOLUTION INTRAMUSCULAR; INTRAVENOUS at 02:05

## 2024-05-15 NOTE — ED PROVIDER NOTES
Encounter Date: 5/15/2024       History     Chief Complaint   Patient presents with    Chest Pain    Back Pain    Cough    Weakness     C/o not getting much better from recent  ER visit continued headache, states midsternal chest pain radiating to back with cough, weakness    Headache     Rosalba Queen is a 18 y.o. female with a history of ADHD, asthma, and anxiety who presents to the ED complaining of cough and congestion x 5 days. Has been coughing so much that now her chest and upper back are sore. Pain is worse with movement and coughing. Reproducible on exam. She denies fevers, chills, wheezing, SOB, N/V/D.    The history is provided by the patient.     Review of patient's allergies indicates:   Allergen Reactions    Amoxicillin-pot clavulanate     Cinnamon analogues      Past Medical History:   Diagnosis Date    ADHD     Anxiety     Arrhythmia     Asthma     GERD (gastroesophageal reflux disease)     Mycoplasma pneumonia     Vesicoureteral reflux      Past Surgical History:   Procedure Laterality Date    CHOLECYSTECTOMY       Family History   Problem Relation Name Age of Onset    Gallbladder disease Mother          removed    Hypertension Maternal Grandmother      Atrial fibrillation Maternal Grandfather      Arrhythmia Maternal Grandfather      Irregular heart beat Maternal Grandfather      Hypertension Maternal Grandfather      Anemia Neg Hx      Cardiomyopathy Neg Hx      Childhood respiratory disease Neg Hx      Clotting disorder Neg Hx      Congenital heart disease Neg Hx      Early death Neg Hx      Heart attacks under age 50 Neg Hx       Social History     Tobacco Use    Smoking status: Passive Smoke Exposure - Never Smoker   Substance Use Topics    Alcohol use: Never    Drug use: Never     Review of Systems   Constitutional:  Negative for chills and fever.   HENT:  Positive for congestion. Negative for sore throat.    Respiratory:  Positive for cough. Negative for shortness of breath.     Cardiovascular:  Positive for chest pain. Negative for palpitations and leg swelling.   Gastrointestinal:  Negative for abdominal pain and nausea.   Genitourinary:  Negative for dysuria and frequency.   Musculoskeletal:  Positive for back pain.   Skin:  Negative for rash.   Neurological:  Negative for dizziness and weakness.   Hematological:  Does not bruise/bleed easily.       Physical Exam     Initial Vitals [05/15/24 1306]   BP Pulse Resp Temp SpO2   118/77 78 20 98.1 °F (36.7 °C) 99 %      MAP       --         Physical Exam    Nursing note and vitals reviewed.  Constitutional: She appears well-developed and well-nourished. No distress.   HENT:   Head: Normocephalic and atraumatic.   Mouth/Throat: No oropharyngeal exudate.   Eyes: EOM are normal. No scleral icterus.   Neck: Neck supple.   Normal range of motion.  Cardiovascular:  Normal rate and regular rhythm.           No murmur heard.  Pulmonary/Chest: Breath sounds normal. No respiratory distress. She has no wheezes. She exhibits tenderness.   Abdominal: Abdomen is soft. Bowel sounds are normal. She exhibits no distension. There is no abdominal tenderness.   Musculoskeletal:         General: No tenderness. Normal range of motion.      Cervical back: Normal range of motion and neck supple.     Neurological: She is alert and oriented to person, place, and time. No cranial nerve deficit.   Skin: Skin is warm and dry. Capillary refill takes less than 2 seconds. No erythema.   Psychiatric: She has a normal mood and affect. Her behavior is normal. Judgment and thought content normal.         ED Course   Procedures  Labs Reviewed   COVID/FLU A&B PCR - Normal    Narrative:     The Xpert Xpress SARS-CoV-2/FLU/RSV plus is a rapid, multiplexed real-time PCR test intended for the simultaneous qualitative detection and differentiation of SARS-CoV-2, Influenza A, Influenza B, and respiratory syncytial virus (RSV) viral RNA in either nasopharyngeal swab or nasal swab  specimens.         POCT URINE PREGNANCY          Imaging Results              X-Ray Chest PA And Lateral (Final result)  Result time 05/15/24 13:36:59      Final result by Roque Feng MD (05/15/24 13:36:59)                   Impression:      No acute chest disease is identified.      Electronically signed by: Roque Feng  Date:    05/15/2024  Time:    13:36               Narrative:    EXAMINATION:  XR CHEST PA AND LATERAL    CLINICAL HISTORY:  cough;, .    FINDINGS:  No alveolar consolidation, effusion, or pneumothorax is seen.   The thoracic aorta is normal  cardiac silhouette, central pulmonary vessels and mediastinum are normal in size and are grossly unremarkable.   visualized osseous structures are grossly unremarkable.                                       Medications   ketorolac injection 30 mg (30 mg Intramuscular Given 5/15/24 1427)     Medical Decision Making  Differential: covid, flu, pneumonia, costochondritis, among others    ED management: HDS and afebrile. Lungs CTA bilaterally. COVID, flu negative. CXR without consolidation or effusion. Pt did not want EKG obtined, requested to leave due to improvement in headache and pain with toradol. Suspect viral URI and costochondritis, stable for discharge home. Instructed to follow up with PCP in 3 days. ED return precautions given. She verbalized understanding. All questions answered.     Amount and/or Complexity of Data Reviewed  Labs: ordered.  Radiology: ordered. Decision-making details documented in ED Course.    Risk  OTC drugs.  Prescription drug management.               ED Course as of 05/15/24 1505   Wed May 15, 2024   1343 X-Ray Chest PA And Lateral  No acute chest disease is identified.  [KD]   1502 Pt reports improvement and is requesting discharge. Still waiting for EKG but she does not wish to have it done at this time.  [KD]      ED Course User Index  [KD] Stacey Samayoa, MICHAEL                             Clinical  Impression:  Final diagnoses:  [R07.89] Atypical chest pain  [J06.9] Viral URI with cough (Primary)          ED Disposition Condition    Discharge Stable          ED Prescriptions       Medication Sig Dispense Start Date End Date Auth. Provider    loratadine (CLARITIN) 10 mg tablet Take 1 tablet (10 mg total) by mouth once daily. for 7 days 7 tablet 5/15/2024 5/22/2024 Stacey Samayoa PA-C    benzonatate (TESSALON) 100 MG capsule Take 1 capsule (100 mg total) by mouth 3 (three) times daily as needed for Cough. 20 capsule 5/15/2024 5/25/2024 Stacey Samayoa PA-C    ketorolac (TORADOL) 10 mg tablet Take 1 tablet (10 mg total) by mouth every 6 (six) hours as needed for Pain (headaches). 20 tablet 5/15/2024 5/20/2024 Stacey Samayoa PA-C          Follow-up Information       Follow up With Specialties Details Why Contact Info    [facility] Emergency Medicine  If symptoms worsen [address] [facility] [address] [phone]    Shailesh Leger III, APRN-CNP Family Medicine In 3 days Hospital follow up [address] [facility] [address] [phone]               Satcey Samayoa PA-C  05/15/24 0019

## 2024-05-15 NOTE — Clinical Note
"Rosalba Salmon (Kaitlyn)evelio was seen and treated in our emergency department on 5/15/2024.  She may return to work on 05/18/2024.       If you have any questions or concerns, please don't hesitate to call.      Mena GARCIA    "

## 2024-11-30 ENCOUNTER — HOSPITAL ENCOUNTER (EMERGENCY)
Facility: HOSPITAL | Age: 19
Discharge: HOME | End: 2024-11-30
Attending: STUDENT IN AN ORGANIZED HEALTH CARE EDUCATION/TRAINING PROGRAM
Payer: MEDICAID

## 2024-11-30 VITALS
WEIGHT: 193.13 LBS | RESPIRATION RATE: 18 BRPM | DIASTOLIC BLOOD PRESSURE: 94 MMHG | HEIGHT: 61 IN | BODY MASS INDEX: 36.46 KG/M2 | TEMPERATURE: 98 F | OXYGEN SATURATION: 97 % | SYSTOLIC BLOOD PRESSURE: 129 MMHG | HEART RATE: 73 BPM

## 2024-11-30 DIAGNOSIS — B33.8 RSV INFECTION: Primary | ICD-10-CM

## 2024-11-30 LAB
ALBUMIN SERPL-MCNC: 3.8 G/DL (ref 3.5–5)
ALBUMIN/GLOB SERPL: 1.2 RATIO (ref 1.1–2)
ALP SERPL-CCNC: 90 UNIT/L (ref 40–150)
ALT SERPL-CCNC: 12 UNIT/L (ref 0–55)
ANION GAP SERPL CALC-SCNC: 7 MEQ/L
AST SERPL-CCNC: 11 UNIT/L (ref 5–34)
B-HCG UR QL: NEGATIVE
BASOPHILS # BLD AUTO: 0.08 X10(3)/MCL
BASOPHILS NFR BLD AUTO: 0.8 %
BILIRUB SERPL-MCNC: 0.3 MG/DL
BUN SERPL-MCNC: 11.1 MG/DL (ref 7–18.7)
CALCIUM SERPL-MCNC: 9.6 MG/DL (ref 8.4–10.2)
CHLORIDE SERPL-SCNC: 105 MMOL/L (ref 98–107)
CO2 SERPL-SCNC: 27 MMOL/L (ref 22–29)
CREAT SERPL-MCNC: 0.8 MG/DL (ref 0.55–1.02)
CREAT/UREA NIT SERPL: 14
CTP QC/QA: YES
EOSINOPHIL # BLD AUTO: 0.46 X10(3)/MCL (ref 0–0.9)
EOSINOPHIL NFR BLD AUTO: 4.9 %
ERYTHROCYTE [DISTWIDTH] IN BLOOD BY AUTOMATED COUNT: 11.6 % (ref 11.5–17)
FLUAV AG UPPER RESP QL IA.RAPID: NOT DETECTED
FLUBV AG UPPER RESP QL IA.RAPID: NOT DETECTED
GFR SERPLBLD CREATININE-BSD FMLA CKD-EPI: >60 ML/MIN/1.73/M2
GLOBULIN SER-MCNC: 3.1 GM/DL (ref 2.4–3.5)
GLUCOSE SERPL-MCNC: 75 MG/DL (ref 74–100)
HCT VFR BLD AUTO: 43 % (ref 37–47)
HGB BLD-MCNC: 14.5 G/DL (ref 12–16)
HOLD SPECIMEN: NORMAL
IMM GRANULOCYTES # BLD AUTO: 0.06 X10(3)/MCL (ref 0–0.04)
IMM GRANULOCYTES NFR BLD AUTO: 0.6 %
LYMPHOCYTES # BLD AUTO: 2.49 X10(3)/MCL (ref 0.6–4.6)
LYMPHOCYTES NFR BLD AUTO: 26.4 %
MCH RBC QN AUTO: 29.7 PG (ref 27–31)
MCHC RBC AUTO-ENTMCNC: 33.7 G/DL (ref 33–36)
MCV RBC AUTO: 88.1 FL (ref 80–94)
MONOCYTES # BLD AUTO: 1.1 X10(3)/MCL (ref 0.1–1.3)
MONOCYTES NFR BLD AUTO: 11.7 %
NEUTROPHILS # BLD AUTO: 5.24 X10(3)/MCL (ref 2.1–9.2)
NEUTROPHILS NFR BLD AUTO: 55.6 %
NRBC BLD AUTO-RTO: 0 %
PLATELET # BLD AUTO: 286 X10(3)/MCL (ref 130–400)
PMV BLD AUTO: 10.6 FL (ref 7.4–10.4)
POTASSIUM SERPL-SCNC: 4.1 MMOL/L (ref 3.5–5.1)
PROT SERPL-MCNC: 6.9 GM/DL (ref 6.4–8.3)
RBC # BLD AUTO: 4.88 X10(6)/MCL (ref 4.2–5.4)
RSV A 5' UTR RNA NPH QL NAA+PROBE: DETECTED
SARS-COV-2 RNA RESP QL NAA+PROBE: NOT DETECTED
SODIUM SERPL-SCNC: 139 MMOL/L (ref 136–145)
STREP A PCR (OHS): NOT DETECTED
TROPONIN I SERPL-MCNC: <0.01 NG/ML (ref 0–0.04)
WBC # BLD AUTO: 9.43 X10(3)/MCL (ref 4.5–11.5)

## 2024-11-30 PROCEDURE — 93005 ELECTROCARDIOGRAM TRACING: CPT

## 2024-11-30 PROCEDURE — 99285 EMERGENCY DEPT VISIT HI MDM: CPT | Mod: 25

## 2024-11-30 PROCEDURE — 81025 URINE PREGNANCY TEST: CPT | Performed by: PHYSICIAN ASSISTANT

## 2024-11-30 PROCEDURE — 84484 ASSAY OF TROPONIN QUANT: CPT | Performed by: PHYSICIAN ASSISTANT

## 2024-11-30 PROCEDURE — 80053 COMPREHEN METABOLIC PANEL: CPT | Performed by: PHYSICIAN ASSISTANT

## 2024-11-30 PROCEDURE — 87651 STREP A DNA AMP PROBE: CPT | Performed by: PHYSICIAN ASSISTANT

## 2024-11-30 PROCEDURE — 85025 COMPLETE CBC W/AUTO DIFF WBC: CPT | Performed by: PHYSICIAN ASSISTANT

## 2024-11-30 PROCEDURE — 0241U COVID/RSV/FLU A&B PCR: CPT | Performed by: PHYSICIAN ASSISTANT

## 2024-11-30 RX ORDER — PREDNISONE 20 MG/1
40 TABLET ORAL DAILY
Qty: 10 TABLET | Refills: 0 | Status: SHIPPED | OUTPATIENT
Start: 2024-11-30 | End: 2024-12-05

## 2024-11-30 RX ORDER — PROMETHAZINE HYDROCHLORIDE AND DEXTROMETHORPHAN HYDROBROMIDE 6.25; 15 MG/5ML; MG/5ML
5 SYRUP ORAL EVERY 6 HOURS PRN
Qty: 118 ML | Refills: 0 | Status: SHIPPED | OUTPATIENT
Start: 2024-11-30

## 2024-11-30 NOTE — ED PROVIDER NOTES
Encounter Date: 11/30/2024     History     Chief Complaint   Patient presents with    Cough     Reports cough, throat pain, chest pain during cough since last Sunday. Seen at  on Tuesday and sent home with Z-pack. Still taking medication. States no improvement with symptoms.     Chest Pain     Patient with no pmhx presents today c/o cough, sore throat, sinus congestion, rhinorrhea, and chest pain only with coughing for 1 week. Patient says she was seen at an urgent care earlier in the week where she was tested for covid, flu, and strep throat. Results were negative. She was prescribed a zpack which is she taking as prescribed. Patient says she only has one dose left, but is not feeling any better. She has also been using astelin nasal spray.    The history is provided by the patient. No  was used.     Review of patient's allergies indicates:   Allergen Reactions    Amoxicillin-pot clavulanate     Cinnamon analogues      Past Medical History:   Diagnosis Date    ADHD     Anxiety     Arrhythmia     Asthma     GERD (gastroesophageal reflux disease)     Mycoplasma pneumonia     Vesicoureteral reflux      Past Surgical History:   Procedure Laterality Date    CHOLECYSTECTOMY       Family History   Problem Relation Name Age of Onset    Gallbladder disease Mother          removed    Hypertension Maternal Grandmother      Atrial fibrillation Maternal Grandfather      Arrhythmia Maternal Grandfather      Irregular heart beat Maternal Grandfather      Hypertension Maternal Grandfather      Anemia Neg Hx      Cardiomyopathy Neg Hx      Childhood respiratory disease Neg Hx      Clotting disorder Neg Hx      Congenital heart disease Neg Hx      Early death Neg Hx      Heart attacks under age 50 Neg Hx       Social History     Tobacco Use    Smoking status: Passive Smoke Exposure - Never Smoker   Substance Use Topics    Alcohol use: Never    Drug use: Never     Review of Systems   Constitutional:  Negative  for chills and fever.   HENT:  Positive for congestion, postnasal drip, rhinorrhea and sore throat. Negative for ear discharge, ear pain, sinus pressure, sinus pain and voice change.    Respiratory:  Positive for cough. Negative for shortness of breath, wheezing and stridor.    Cardiovascular:  Positive for chest pain. Negative for palpitations and leg swelling.   Gastrointestinal:  Negative for abdominal pain, constipation, diarrhea, nausea and vomiting.   Genitourinary:  Negative for dysuria, flank pain and hematuria.   Musculoskeletal:  Negative for arthralgias and myalgias.   Skin:  Negative for rash.   Allergic/Immunologic: Negative for immunocompromised state.   Neurological:  Negative for syncope, light-headedness and headaches.   All other systems reviewed and are negative.      Physical Exam     Initial Vitals [11/30/24 1721]   BP Pulse Resp Temp SpO2   133/89 85 18 97.8 °F (36.6 °C) 99 %      MAP       --         Physical Exam    Vitals reviewed.  Constitutional: She is not diaphoretic. No distress.   HENT:   Head: Normocephalic and atraumatic.   Right Ear: External ear normal.   Left Ear: External ear normal.   Nose: Nose normal. Mouth/Throat: Oropharynx is clear and moist. No oropharyngeal exudate.   Eyes: Conjunctivae and EOM are normal.   Neck: Neck supple.   Normal range of motion.  Cardiovascular:  Normal rate, regular rhythm, normal heart sounds and intact distal pulses.           Pulmonary/Chest: Breath sounds normal. No respiratory distress. She has no wheezes. She has no rhonchi. She has no rales. She exhibits no tenderness.   Abdominal: Abdomen is soft. She exhibits no distension. There is no abdominal tenderness.   Musculoskeletal:         General: No edema.      Cervical back: Normal range of motion and neck supple.     Neurological: She is alert and oriented to person, place, and time. GCS score is 15. GCS eye subscore is 4. GCS verbal subscore is 5. GCS motor subscore is 6.   Skin: Skin is  warm and dry. Capillary refill takes less than 2 seconds. No rash noted.   Psychiatric: She has a normal mood and affect.         ED Course   Procedures  Labs Reviewed   COVID/RSV/FLU A&B PCR - Abnormal       Result Value    Influenza A PCR Not Detected      Influenza B PCR Not Detected      Respiratory Syncytial Virus PCR Detected (*)     SARS-CoV-2 PCR Not Detected      Narrative:     The Xpert Xpress SARS-CoV-2/FLU/RSV plus is a rapid, multiplexed real-time PCR test intended for the simultaneous qualitative detection and differentiation of SARS-CoV-2, Influenza A, Influenza B, and respiratory syncytial virus (RSV) viral RNA in either nasopharyngeal swab or nasal swab specimens.         CBC WITH DIFFERENTIAL - Abnormal    WBC 9.43      RBC 4.88      Hgb 14.5      Hct 43.0      MCV 88.1      MCH 29.7      MCHC 33.7      RDW 11.6      Platelet 286      MPV 10.6 (*)     Neut % 55.6      Lymph % 26.4      Mono % 11.7      Eos % 4.9      Basophil % 0.8      Lymph # 2.49      Neut # 5.24      Mono # 1.10      Eos # 0.46      Baso # 0.08      IG# 0.06 (*)     IG% 0.6      NRBC% 0.0     TROPONIN I - Normal    Troponin-I <0.010     STREP GROUP A BY PCR - Normal    STREP A PCR ([facility]) Not Detected      Narrative:     The Xpert Xpress Strep A test is a rapid, qualitative in vitro diagnostic test for the detection of Streptococcus pyogenes (Group A ß-hemolytic Streptococcus, Strep A) in throat swab specimens from patients with signs and symptoms of pharyngitis.     CBC W/ AUTO DIFFERENTIAL    Narrative:     The following orders were created for panel order CBC Auto Differential.  Procedure                               Abnormality         Status                     ---------                               -----------         ------                     CBC with Differential[[id number]]       Abnormal            Final result                 Please view results for these tests on the individual orders.   COMPREHENSIVE METABOLIC  PANEL    Sodium 139      Potassium 4.1      Chloride 105      CO2 27      Glucose 75      Blood Urea Nitrogen 11.1      Creatinine 0.80      Calcium 9.6      Protein Total 6.9      Albumin 3.8      Globulin 3.1      Albumin/Globulin Ratio 1.2      Bilirubin Total 0.3      ALP 90      ALT 12      AST 11      eGFR >60      Anion Gap 7.0      BUN/Creatinine Ratio 14     EXTRA TUBES    Narrative:     The following orders were created for panel order EXTRA TUBES.  Procedure                               Abnormality         Status                     ---------                               -----------         ------                     Light Blue Top Hold[[id number]]                             In process                 Lavender Top Hold[[id number]]                               In process                 Gold Top Hold[[id number]]                                   In process                   Please view results for these tests on the individual orders.   LIGHT BLUE TOP HOLD   LAVENDER TOP HOLD   GOLD TOP HOLD   POCT URINE PREGNANCY    POC Preg Test, Ur Negative       Acceptable Yes       EKG Readings: (Independently Interpreted)   Initial Reading: No STEMI. Rhythm: Normal Sinus Rhythm. Heart Rate: 87. Ectopy: No Ectopy. Conduction: Normal. ST Segments: Normal ST Segments. T Waves: Normal. Axis: Normal.     ECG Results              EKG 12-lead (Chest Pain) Age >30 (In process)        Collection Time Result Time QRS Duration [facility] QTC Calculation    11/30/24 17:33:04 11/30/24 17:39:40 88 416                     In process by Interface, Lab In [facility] (11/30/24 17:39:48)                   Narrative:    Test Reason : R07.9,    Vent. Rate :  87 BPM     Atrial Rate :  87 BPM     P-R Int : 122 ms          QRS Dur :  88 ms      QT Int : 346 ms       P-R-T Axes :  41  77  29 degrees    QTcB Int : 416 ms    Normal sinus rhythm with sinus arrhythmia  Normal ECG  When compared with ECG of 15-Oct-2018 10:28,  PREVIOUS  ECG IS PRESENT    Referred By: AAAREFERRAL SELF           Confirmed By:                                   Imaging Results              X-Ray Chest PA And Lateral (Preliminary result)  Result time 11/30/24 19:46:31      Wet Read by Karen Valdez PA (11/30/24 19:46:31, [facility], Emergency Medicine)    No acute abnormality                                      Medications - No data to display  Medical Decision Making  Ddx: influenza, covid-19, rsv, pneumonia, strep throat, amongst others    Amount and/or Complexity of Data Reviewed  External Data Reviewed: notes.  Labs: ordered.  Radiology: ordered. Decision-making details documented in ED Course.  ECG/medicine tests: ordered. Decision-making details documented in ED Course.    Risk  Risk Details: Given strict ED return precautions. I have spoken with the patient and/or caregivers. I have explained the patient's condition, diagnoses and treatment plan based on the information available to me at this time. I have answered the patient's and/or caregiver's questions and addressed any concerns. The patient and/or caregivers have as good an understanding of the patient's diagnosis, condition and treatment plan as can be expected at this point. The vital signs have been stable. The patient's condition is stable and appropriate for discharge from the emergency department.      The patient will pursue further outpatient evaluation with the primary care physician or other designated or consulting physician as outlined in the discharge instructions. The patient and/or caregivers are agreeable to this plan of care and follow-up instructions have been explained in detail. The patient and/or caregivers have received these instructions in written format and have expressed an understanding of the discharge instructions. The patient and/or caregivers are aware that any significant change in condition or worsening of symptoms should prompt an  immediate return to this or the closest emergency department or a call to 911                       ED Course as of 11/30/24 1954   Sat Nov 30, 2024 1903 WBC: 9.43 [SA]   1903 Hemoglobin: 14.5 [SA]   1903 Hematocrit: 43.0 [SA]   1903 Platelet Count: 286 [SA]   1903 BUN: 11.1 [SA]   1903 Creatinine: 0.80 [SA]   1903 hCG Qualitative, Urine: Negative [SA]   1903 STREP A PCR ([facility]): Not Detected [SA]   1912 Troponin I: <0.010 [SA]   1945 Influenza A, Molecular: Not Detected [SA]   1945 Influenza B, Molecular: Not Detected [SA]   1945 RSV Ag by Molecular Method(!): Detected [SA]   1945 SARS-CoV2 (COVID-19) Qualitative PCR: Not Detected [SA]      ED Course User Index  [SA] Karen Valdez PA                           Clinical Impression:  Final diagnoses:  [B33.8] RSV infection (Primary)          ED Disposition Condition    Discharge Stable          ED Prescriptions       Medication Sig Dispense Start Date End Date Auth. Provider    predniSONE (DELTASONE) 20 MG tablet Take 2 tablets (40 mg total) by mouth once daily. for 5 days 10 tablet 11/30/2024 12/5/2024 Karen Valdez PA    promethazine-dextromethorphan (PROMETHAZINE-DM) 6.25-15 mg/5 mL Syrp Take 5 mLs by mouth every 6 (six) hours as needed (cough). 118 mL 11/30/2024 -- Karen Valdez PA          Follow-up Information       Follow up With Specialties Details Why Contact Info    [facility] Emergency Medicine  If symptoms worsen return to ED immediately [address] [facility] [address] [phone]    Shailesh Leger III, APRN-CNP Family Medicine In 2 days  [address] [facility] [address] [phone]               Karen Valdez PA  11/30/24 2004

## 2024-11-30 NOTE — Clinical Note
"Rosalba Salmon (Kaitlyn)evelio was seen and treated in our emergency department on 11/30/2024.  She may return to work on 12/03/2024.       If you have any questions or concerns, please don't hesitate to call.       RN    "

## 2024-12-02 LAB
OHS QRS DURATION: 88 MS
OHS QTC CALCULATION: 416 MS

## 2025-07-01 ENCOUNTER — HOSPITAL ENCOUNTER (EMERGENCY)
Facility: HOSPITAL | Age: 20
Discharge: HOME | End: 2025-07-01
Attending: FAMILY MEDICINE
Payer: MEDICAID

## 2025-07-01 VITALS
HEIGHT: 61 IN | WEIGHT: 184 LBS | RESPIRATION RATE: 20 BRPM | HEART RATE: 73 BPM | DIASTOLIC BLOOD PRESSURE: 85 MMHG | SYSTOLIC BLOOD PRESSURE: 140 MMHG | TEMPERATURE: 98 F | OXYGEN SATURATION: 100 % | BODY MASS INDEX: 34.74 KG/M2

## 2025-07-01 DIAGNOSIS — R10.9 RIGHT FLANK PAIN: Primary | ICD-10-CM

## 2025-07-01 DIAGNOSIS — R07.9 CHEST PAIN: ICD-10-CM

## 2025-07-01 LAB
ALBUMIN SERPL-MCNC: 4.4 G/DL (ref 3.5–5)
ALBUMIN/GLOB SERPL: 1.1 RATIO (ref 1.1–2)
ALP SERPL-CCNC: 96 UNIT/L (ref 40–150)
ALT SERPL-CCNC: 23 UNIT/L (ref 0–55)
ANION GAP SERPL CALC-SCNC: 11 MEQ/L
AST SERPL-CCNC: 18 UNIT/L (ref 11–45)
B-HCG FREE SERPL-ACNC: <2.42 MIU/ML
BACTERIA #/AREA URNS AUTO: ABNORMAL /HPF
BASOPHILS # BLD AUTO: 0.07 X10(3)/MCL
BASOPHILS NFR BLD AUTO: 0.7 %
BILIRUB SERPL-MCNC: 0.4 MG/DL
BILIRUB UR QL STRIP.AUTO: NEGATIVE
BUN SERPL-MCNC: 11.3 MG/DL (ref 7–18.7)
CALCIUM SERPL-MCNC: 9.4 MG/DL (ref 8.4–10.2)
CHLORIDE SERPL-SCNC: 103 MMOL/L (ref 98–107)
CLARITY UR: CLEAR
CO2 SERPL-SCNC: 26 MMOL/L (ref 22–29)
COLOR UR AUTO: COLORLESS
CREAT SERPL-MCNC: 0.78 MG/DL (ref 0.55–1.02)
CREAT/UREA NIT SERPL: 14
EOSINOPHIL # BLD AUTO: 0.28 X10(3)/MCL (ref 0–0.9)
EOSINOPHIL NFR BLD AUTO: 2.9 %
ERYTHROCYTE [DISTWIDTH] IN BLOOD BY AUTOMATED COUNT: 11.3 % (ref 11.5–17)
GFR SERPLBLD CREATININE-BSD FMLA CKD-EPI: >60 ML/MIN/1.73/M2
GLOBULIN SER-MCNC: 4.1 GM/DL (ref 2.4–3.5)
GLUCOSE SERPL-MCNC: 76 MG/DL (ref 74–100)
GLUCOSE UR QL STRIP: NORMAL
HCT VFR BLD AUTO: 42.9 % (ref 37–47)
HGB BLD-MCNC: 14.4 G/DL (ref 12–16)
HGB UR QL STRIP: ABNORMAL
HOLD SPECIMEN: NORMAL
IMM GRANULOCYTES # BLD AUTO: 0.04 X10(3)/MCL (ref 0–0.04)
IMM GRANULOCYTES NFR BLD AUTO: 0.4 %
KETONES UR QL STRIP: NEGATIVE
LEUKOCYTE ESTERASE UR QL STRIP: NEGATIVE
LYMPHOCYTES # BLD AUTO: 2.76 X10(3)/MCL (ref 0.6–4.6)
LYMPHOCYTES NFR BLD AUTO: 28.4 %
MCH RBC QN AUTO: 29.7 PG (ref 27–31)
MCHC RBC AUTO-ENTMCNC: 33.6 G/DL (ref 33–36)
MCV RBC AUTO: 88.5 FL (ref 80–94)
MONOCYTES # BLD AUTO: 0.76 X10(3)/MCL (ref 0.1–1.3)
MONOCYTES NFR BLD AUTO: 7.8 %
NEUTROPHILS # BLD AUTO: 5.82 X10(3)/MCL (ref 2.1–9.2)
NEUTROPHILS NFR BLD AUTO: 59.8 %
NITRITE UR QL STRIP: NEGATIVE
NRBC BLD AUTO-RTO: 0 %
PH UR STRIP: 7.5 [PH]
PLATELET # BLD AUTO: 336 X10(3)/MCL (ref 130–400)
PMV BLD AUTO: 10.9 FL (ref 7.4–10.4)
POTASSIUM SERPL-SCNC: 3.7 MMOL/L (ref 3.5–5.1)
PROT SERPL-MCNC: 8.5 GM/DL (ref 6.4–8.3)
PROT UR QL STRIP: NEGATIVE
RBC # BLD AUTO: 4.85 X10(6)/MCL (ref 4.2–5.4)
RBC #/AREA URNS AUTO: ABNORMAL /HPF
SODIUM SERPL-SCNC: 140 MMOL/L (ref 136–145)
SP GR UR STRIP.AUTO: 1.01 (ref 1–1.03)
SQUAMOUS #/AREA URNS LPF: ABNORMAL /HPF
UROBILINOGEN UR STRIP-ACNC: NORMAL
WBC # BLD AUTO: 9.73 X10(3)/MCL (ref 4.5–11.5)
WBC #/AREA URNS AUTO: ABNORMAL /HPF

## 2025-07-01 PROCEDURE — 93005 ELECTROCARDIOGRAM TRACING: CPT

## 2025-07-01 PROCEDURE — 85025 COMPLETE CBC W/AUTO DIFF WBC: CPT | Performed by: PHYSICIAN ASSISTANT

## 2025-07-01 PROCEDURE — 80053 COMPREHEN METABOLIC PANEL: CPT | Performed by: PHYSICIAN ASSISTANT

## 2025-07-01 PROCEDURE — 84702 CHORIONIC GONADOTROPIN TEST: CPT | Performed by: PHYSICIAN ASSISTANT

## 2025-07-01 PROCEDURE — 81001 URINALYSIS AUTO W/SCOPE: CPT | Performed by: PHYSICIAN ASSISTANT

## 2025-07-01 PROCEDURE — 99285 EMERGENCY DEPT VISIT HI MDM: CPT | Mod: 25

## 2025-07-01 RX ORDER — DICLOFENAC SODIUM 75 MG/1
75 TABLET, DELAYED RELEASE ORAL 2 TIMES DAILY
Qty: 14 TABLET | Refills: 0 | Status: SHIPPED | OUTPATIENT
Start: 2025-07-01 | End: 2025-07-08

## 2025-07-01 RX ORDER — TIZANIDINE 2 MG/1
2 TABLET ORAL EVERY 8 HOURS
Qty: 15 TABLET | Refills: 0 | Status: SHIPPED | OUTPATIENT
Start: 2025-07-01 | End: 2025-07-06

## 2025-07-01 RX ORDER — KETOROLAC TROMETHAMINE 10 MG/1
10 TABLET, FILM COATED ORAL
Status: DISCONTINUED | OUTPATIENT
Start: 2025-07-01 | End: 2025-07-01 | Stop reason: HOSPADM

## 2025-07-02 LAB
OHS QRS DURATION: 88 MS
OHS QTC CALCULATION: 411 MS

## 2025-07-02 NOTE — ED PROVIDER NOTES
"Encounter Date: 7/1/2025       History     Chief Complaint   Patient presents with    Flank Pain     PT W CO RT FLANK PAIN W RAD TO RLQ X 1 WKS.  DENIES DYSURIA/HEMATURIA. NO NV OR FEVER.  ALSO CO INCREASE IN ANXIETY W SUBSTERNAL CP.  EKG OBTAINED.     Chest Pain     Rosalba Queen is a 19 y.o. female who presents to the ED with complaints of right sided flank pain that radiates to her RLQ that started 1 week(s) ago. She denies dysuria, hematuria, nausea, vomiting, diarrhea, fever, and chills. She also denies injury. She reports "I thought I just needed a new mattress."       The history is provided by the patient. No  was used.     Review of patient's allergies indicates:   Allergen Reactions    Amoxicillin-pot clavulanate     Cinnamon analogues      Past Medical History:   Diagnosis Date    ADHD     Anxiety     Arrhythmia     Asthma     GERD (gastroesophageal reflux disease)     Mycoplasma pneumonia     Vesicoureteral reflux      Past Surgical History:   Procedure Laterality Date    CHOLECYSTECTOMY       Family History   Problem Relation Name Age of Onset    Gallbladder disease Mother          removed    Hypertension Maternal Grandmother      Atrial fibrillation Maternal Grandfather      Arrhythmia Maternal Grandfather      Irregular heart beat Maternal Grandfather      Hypertension Maternal Grandfather      Anemia Neg Hx      Cardiomyopathy Neg Hx      Childhood respiratory disease Neg Hx      Clotting disorder Neg Hx      Congenital heart disease Neg Hx      Early death Neg Hx      Heart attacks under age 50 Neg Hx       Social History[1]  Review of Systems   Constitutional:  Negative for chills, fatigue and fever.   HENT:  Negative for congestion, ear pain, sinus pain and sore throat.    Eyes:  Negative for pain.   Respiratory:  Negative for cough, chest tightness and shortness of breath.    Cardiovascular:  Negative for chest pain.   Gastrointestinal:  Positive for abdominal pain. " Negative for constipation, diarrhea, nausea and vomiting.   Genitourinary:  Positive for flank pain. Negative for dysuria, hematuria, pelvic pain, urgency, vaginal bleeding, vaginal discharge and vaginal pain.   Musculoskeletal:  Negative for back pain and joint swelling.   Skin:  Negative for color change and rash.   Neurological:  Negative for dizziness and weakness.   Psychiatric/Behavioral:  Negative for behavioral problems and confusion.        Physical Exam     Initial Vitals [07/01/25 1650]   BP Pulse Resp Temp SpO2   (!) 127/91 83 18 97.9 °F (36.6 °C) 100 %      MAP       --         Physical Exam    Nursing note and vitals reviewed.  Constitutional: She appears well-developed and well-nourished.   HENT:   Head: Normocephalic and atraumatic.   Nose: Nose normal.   Eyes: EOM are normal. Pupils are equal, round, and reactive to light.   Neck: Neck supple. No thyromegaly present. No JVD present.   Normal range of motion.  Cardiovascular:  Normal rate, regular rhythm, normal heart sounds and intact distal pulses.           No murmur heard.  Pulmonary/Chest: Breath sounds normal. No respiratory distress. She has no wheezes. She has no rhonchi. She has no rales. She exhibits no tenderness.   Abdominal: Abdomen is soft. Bowel sounds are normal. She exhibits no distension. There is no abdominal tenderness.   There is right CVA tenderness.There is no rebound, no guarding and no tenderness at McBurney's point.   Musculoskeletal:         General: No tenderness or edema. Normal range of motion.      Cervical back: Normal range of motion and neck supple.     Lymphadenopathy:     She has no cervical adenopathy.   Neurological: She is alert and oriented to person, place, and time.   Skin: Skin is warm and dry. Capillary refill takes less than 2 seconds.   Psychiatric: She has a normal mood and affect. Thought content normal.         ED Course   Procedures  Labs Reviewed   COMPREHENSIVE METABOLIC PANEL - Abnormal        Result Value    Sodium 140      Potassium 3.7      Chloride 103      CO2 26      Glucose 76      Blood Urea Nitrogen 11.3      Creatinine 0.78      Calcium 9.4      Protein Total 8.5 (*)     Albumin 4.4      Globulin 4.1 (*)     Albumin/Globulin Ratio 1.1      Bilirubin Total 0.4      ALP 96      ALT 23      AST 18      eGFR >60      Anion Gap 11.0      BUN/Creatinine Ratio 14     URINALYSIS, REFLEX TO URINE CULTURE - Abnormal    Color, UA Colorless (*)     Appearance, UA Clear      Specific Gravity, UA 1.009      pH, UA 7.5      Protein, UA Negative      Glucose, UA Normal      Ketones, UA Negative      Blood, UA 2+ (*)     Bilirubin, UA Negative      Urobilinogen, UA Normal      Nitrites, UA Negative      Leukocyte Esterase, UA Negative      RBC, UA 6-10 (*)     WBC, UA None Seen      Bacteria, UA Few (*)     Squamous Epithelial Cells, UA Few     CBC WITH DIFFERENTIAL - Abnormal    WBC 9.73      RBC 4.85      Hgb 14.4      Hct 42.9      MCV 88.5      MCH 29.7      MCHC 33.6      RDW 11.3 (*)     Platelet 336      MPV 10.9 (*)     Neut % 59.8      Lymph % 28.4      Mono % 7.8      Eos % 2.9      Basophil % 0.7      Imm Grans % 0.4      Neut # 5.82      Lymph # 2.76      Mono # 0.76      Eos # 0.28      Baso # 0.07      Imm Gran # 0.04      NRBC% 0.0     HCG, QUANTITATIVE - Normal    Beta HCG Quant <2.42     CBC W/ AUTO DIFFERENTIAL    Narrative:     The following orders were created for panel order CBC auto differential.  Procedure                               Abnormality         Status                     ---------                               -----------         ------                     CBC with Differential[[id number]]       Abnormal            Final result                 Please view results for these tests on the individual orders.   EXTRA TUBES    Narrative:     The following orders were created for panel order EXTRA TUBES.  Procedure                               Abnormality         Status                      ---------                               -----------         ------                     Light Blue Top Hold[[id number]]                             Final result               Red Top Hold[[id number]]                                    Final result               Light Green Top Hold[[id number]]                            Final result               Gold Top Hold[[id number]]                                   Final result                 Please view results for these tests on the individual orders.   LIGHT BLUE TOP HOLD    Extra Tube Hold for add-ons.     RED TOP HOLD    Extra Tube Hold for add-ons.     LIGHT GREEN TOP HOLD    Extra Tube Hold for add-ons.     GOLD TOP HOLD    Extra Tube Hold for add-ons.     POCT URINE PREGNANCY     EKG Readings: (Independently Interpreted)   Initial Reading: No STEMI. Rhythm: Sinus Arrhythmia. Heart Rate: 82. Ectopy: No Ectopy. Conduction: Normal.     ECG Results              EKG 12-lead (Chest Pain) Age >30 (In process)        Collection Time Result Time QRS Duration [facility] QTC Calculation    07/01/25 16:44:11 07/01/25 16:46:26 88 411                     In process by Interface, Lab In [facility] (07/01/25 16:46:42)                   Narrative:    Test Reason : R07.9,    Vent. Rate :  82 BPM     Atrial Rate :  82 BPM     P-R Int : 124 ms          QRS Dur :  88 ms      QT Int : 352 ms       P-R-T Axes :  34  85  38 degrees    QTcB Int : 411 ms    Normal sinus rhythm with sinus arrhythmia  Normal ECG  When compared with ECG of 30-Nov-2024 17:33,  No significant change was found    Referred By:            Confirmed By:                                   Imaging Results              CT Abdomen Pelvis  Without Contrast (Final result)  Result time 07/01/25 18:40:30      Final result by Nicky Montesinos MD (07/01/25 18:40:30)                   Impression:      1. No appreciable acute intra-abdominal abnormality by noncontrast CT evaluation.      Electronically signed by: Nicky  Jaguar  Date:    07/01/2025  Time:    18:40               Narrative:    EXAMINATION:  CT ABDOMEN PELVIS WITHOUT CONTRAST    CLINICAL HISTORY:  Flank pain, kidney stone suspected;    TECHNIQUE:  Helically acquired axial images, sagittal and coronal reformations were obtained from the lung bases to the pubic symphysis without the IV administration of contrast.    Automated tube current modulation, weight-based exposure dosing, and/or iterative reconstruction technique utilized to reach lowest reasonably achievable exposure rate.    DLP: 597 mGy*cm    COMPARISON:  CT abdomen pelvis 05/13/2024    FINDINGS:  HEART: Normal in size. No pericardial effusion.    LUNG BASES: Well aerated.    LIVER: Normal attenuation. No appreciable focal hepatic lesion.    BILIARY: Gallbladder is surgically absent.    PANCREAS: No inflammatory change.    SPLEEN: Normal in size    ADRENALS: No mass.    KIDNEYS/URETERS: No renal or ureteral calculus. No hydronephrosis.  No perinephric stranding.    GI TRACT/MESENTERY: Evaluation of the bowel is limited without contrast.  no evidence of bowel obstruction or inflammation. The appendix is normal.    PERITONEUM: No free fluid.No free air.    LYMPH NODES: No enlarged lymph nodes by size criteria.    VASCULATURE: No significant atherosclerosis or aneurysm.  There are unchanged phleboliths in the pelvis.    BLADDER: Normal appearance given degree of distention.    REPRODUCTIVE ORGANS: Normal as visualized.    ABDOMINAL WALL: Unremarkable.    BONES: No acute osseous abnormality.                                       Medications   ketorolac tablet 10 mg (10 mg Oral Not Given 7/1/25 1745)     Medical Decision Making  DDX: kidney stone, acute cystitis, muscular strain, among others    Rosalba Queen is a 19 y.o. female who presents to the ED with complaints of right sided flank pain that radiates to her RLQ that started 1 week(s) ago. She denies dysuria, hematuria, nausea, vomiting, diarrhea, fever,  "and chills. She also denies injury. She reports "I thought I just needed a new mattress."     Hospital Course: On exam, mild tenderness on exam. Patient offered Toradol, she refused. Hematuria on UA, will order CT for further evaluation. CT without acute abnormality. EKG was done in triage because she was complaining of anxiety and chest pain, she has denied the remainder of her stay. EKG nonischemic. Symptoms likely muscular in nature. Will DC home with Diclofenac and Tizanidine. Strict return preacutions given. Stable for discharge.     Amount and/or Complexity of Data Reviewed  Labs: ordered.  Radiology: ordered.    Risk  Prescription drug management.                                      Clinical Impression:  Final diagnoses:  [R07.9] Chest pain  [R10.9] Right flank pain (Primary)          ED Disposition Condition    Discharge Stable          ED Prescriptions       Medication Sig Dispense Start Date End Date Auth. Provider    diclofenac (VOLTAREN) 75 MG EC tablet Take 1 tablet (75 mg total) by mouth 2 (two) times daily. for 7 days 14 tablet 7/1/2025 7/8/2025 Josie Edwards PA-C    tiZANidine (ZANAFLEX) 2 MG tablet Take 1 tablet (2 mg total) by mouth every 8 (eight) hours. for 5 days 15 tablet 7/1/2025 7/6/2025 Josie Edwards PA-C          Follow-up Information       Follow up With Specialties Details Why Contact Shailesh Vásquez III, KAJAL-CNP Family Medicine   [address] [facility] [address] [phone]      [facility] Emergency Medicine In 3 days As needed, If symptoms worsen [address] [facility] [address] [phone]                   [1]   Social History  Tobacco Use    Smoking status: Passive Smoke Exposure - Never Smoker   Vaping Use    Vaping status: Never Used   Substance Use Topics    Alcohol use: Never    Drug use: Never        Josie Edwards PA-C  07/01/25 1916    "